# Patient Record
Sex: FEMALE | Race: WHITE
[De-identification: names, ages, dates, MRNs, and addresses within clinical notes are randomized per-mention and may not be internally consistent; named-entity substitution may affect disease eponyms.]

---

## 2022-09-02 ENCOUNTER — HOSPITAL ENCOUNTER (OUTPATIENT)
Dept: HOSPITAL 47 - RADXRMAIN | Age: 77
Discharge: HOME | End: 2022-09-02
Attending: INTERNAL MEDICINE
Payer: MEDICARE

## 2022-09-02 DIAGNOSIS — C25.0: Primary | ICD-10-CM

## 2022-09-02 PROCEDURE — 71046 X-RAY EXAM CHEST 2 VIEWS: CPT

## 2022-09-02 NOTE — XR
EXAMINATION TYPE: XR chest 2V

 

DATE OF EXAM: 9/2/2022

 

COMPARISON: NONE

 

HISTORY: Malignant neoplasm head of pancreas. Weakness and shortness of breath.

 

TECHNIQUE:  Frontal and lateral views of the chest are obtained.

 

FINDINGS: Suspect mild underlying emphysematous change. Left internal jugular Mediport catheter termi
nates in SVC. There is no focal air space opacity, pleural effusion, or pneumothorax seen.  The cardi
ac silhouette size is within normal limits with atherosclerotic change aortic knob.   The osseous str
uctures are demineralized.

 

IMPRESSION:  Mild chronic emphysematous change without acute pulmonary process.

## 2022-09-09 ENCOUNTER — HOSPITAL ENCOUNTER (OUTPATIENT)
Dept: HOSPITAL 47 - RADCTMAIN | Age: 77
Discharge: HOME | End: 2022-09-09
Attending: INTERNAL MEDICINE
Payer: MEDICARE

## 2022-09-09 DIAGNOSIS — C25.0: ICD-10-CM

## 2022-09-09 DIAGNOSIS — Z03.89: Primary | ICD-10-CM

## 2022-09-09 LAB — BUN SERPL-SCNC: 16 MG/DL (ref 7–17)

## 2022-09-09 PROCEDURE — 36415 COLL VENOUS BLD VENIPUNCTURE: CPT

## 2022-09-09 PROCEDURE — 82565 ASSAY OF CREATININE: CPT

## 2022-09-09 PROCEDURE — 71260 CT THORAX DX C+: CPT

## 2022-09-09 PROCEDURE — 84520 ASSAY OF UREA NITROGEN: CPT

## 2022-09-09 NOTE — CT
EXAMINATION TYPE: CT chest w con

 

DATE OF EXAM: 9/9/2022

 

COMPARISON: Radiograph 9/2/2022

 

HISTORY: 77-year-old female productive cough, hx of pancreatic ca

 

TECHNIQUE: Contiguous axial scanning of the chest after the administration of 70cc mL of Isovue 300. 
 Coronal/sagittal reconstructions performed.

 

CT DLP: 452mGycm. Automatic exposure control utilized for a dose reduction.

 

 

FINDINGS:

Oil cyst measuring 1.2 cm lateral right breast.

 

Heart normal size without pericardial effusion. Left anterior chest wall injection port with catheter
 tip at the lower SVC.

 

Aorta normal caliber with scattered mild to moderate atherosclerotic arch and descending thoracic aor
tic calcifications. Moderate focal atherosclerotic narrowing at the proximal right subclavian artery.


 

No thoracic lymphadenopathy by CT size criteria.

 

Mild diffuse bronchial wall thickening. There are a few scattered focal peribronchovascular and perip
heral opacities. Some tree-in-bud opacity posterior left upper lobe. Some strandy atelectasis or scar
ring in the lower lungs. No large focal area of consolidation or pleural effusion.

 

Small to moderate-sized hiatal hernia. Visualized upper abdomen shows pneumobilia. A metallic stent i
s present in the bile duct and there is diffuse dilatation of the main pancreatic duct noted likely s
econdary to the patient's reported pancreatic cancer.

 

3 mm nonobstructive right renal calculus.

 

Moderate atherosclerotic calcifications visualized upper abdominal aorta.

 

Bones: Arthropathy in the lumbar spine. Moderate degenerative disc disease L2-L3.

 

 

IMPRESSION:  

 

1. A few scattered foci of peribronchovascular opacity especially in the upper and mid lungs. Finding
s may be seen with developing COVID pneumonia or interstitial pneumonitis such as . Clinically cor
relate.

2. Small-to-moderate size hiatal hernia. Pneumobilia and metallic biliary stent. Main pancreatic duct
al dilatation. Findings likely on the basis of patient's known pancreatic cancer. Nonobstructive 3 mm
 right renal stone.

## 2022-09-22 ENCOUNTER — HOSPITAL ENCOUNTER (OUTPATIENT)
Dept: HOSPITAL 47 - LABWHC1 | Age: 77
Discharge: HOME | End: 2022-09-22
Attending: NURSE PRACTITIONER
Payer: MEDICARE

## 2022-09-22 ENCOUNTER — HOSPITAL ENCOUNTER (EMERGENCY)
Dept: HOSPITAL 47 - EC | Age: 77
Discharge: HOME | End: 2022-09-22
Payer: MEDICARE

## 2022-09-22 VITALS
DIASTOLIC BLOOD PRESSURE: 70 MMHG | TEMPERATURE: 96.8 F | SYSTOLIC BLOOD PRESSURE: 147 MMHG | HEART RATE: 105 BPM | RESPIRATION RATE: 20 BRPM

## 2022-09-22 DIAGNOSIS — I10: ICD-10-CM

## 2022-09-22 DIAGNOSIS — R00.2: Primary | ICD-10-CM

## 2022-09-22 DIAGNOSIS — J45.909: ICD-10-CM

## 2022-09-22 DIAGNOSIS — C25.0: Primary | ICD-10-CM

## 2022-09-22 LAB
ALBUMIN SERPL-MCNC: 4.1 G/DL (ref 3.5–5)
ALP SERPL-CCNC: 101 U/L (ref 38–126)
ALT SERPL-CCNC: 75 U/L (ref 4–34)
ANION GAP SERPL CALC-SCNC: 10 MMOL/L
APTT BLD: 21.5 SEC (ref 22–30)
AST SERPL-CCNC: 63 U/L (ref 14–36)
BASOPHILS # BLD AUTO: 0.1 K/UL (ref 0–0.2)
BASOPHILS NFR BLD AUTO: 1 %
BUN SERPL-SCNC: 18 MG/DL (ref 7–17)
CALCIUM SPEC-MCNC: 9.4 MG/DL (ref 8.4–10.2)
CHLORIDE SERPL-SCNC: 103 MMOL/L (ref 98–107)
CO2 SERPL-SCNC: 23 MMOL/L (ref 22–30)
EOSINOPHIL # BLD AUTO: 0 K/UL (ref 0–0.7)
EOSINOPHIL NFR BLD AUTO: 0 %
ERYTHROCYTE [DISTWIDTH] IN BLOOD BY AUTOMATED COUNT: 3.68 M/UL (ref 3.8–5.4)
ERYTHROCYTE [DISTWIDTH] IN BLOOD: 13.3 % (ref 11.5–15.5)
GLUCOSE SERPL-MCNC: 98 MG/DL (ref 74–99)
HCT VFR BLD AUTO: 33.6 % (ref 34–46)
HGB BLD-MCNC: 11 GM/DL (ref 11.4–16)
INR PPP: 1 (ref ?–1.2)
LYMPHOCYTES # SPEC AUTO: 2.6 K/UL (ref 1–4.8)
LYMPHOCYTES NFR SPEC AUTO: 43 %
MAGNESIUM SPEC-SCNC: 1.8 MG/DL (ref 1.6–2.3)
MCH RBC QN AUTO: 29.9 PG (ref 25–35)
MCHC RBC AUTO-ENTMCNC: 32.8 G/DL (ref 31–37)
MCV RBC AUTO: 91.3 FL (ref 80–100)
MONOCYTES # BLD AUTO: 0.1 K/UL (ref 0–1)
MONOCYTES NFR BLD AUTO: 1 %
NEUTROPHILS # BLD AUTO: 3.3 K/UL (ref 1.3–7.7)
NEUTROPHILS NFR BLD AUTO: 53 %
PLATELET # BLD AUTO: 287 K/UL (ref 150–450)
POTASSIUM SERPL-SCNC: 4.4 MMOL/L (ref 3.5–5.1)
PROT SERPL-MCNC: 6.8 G/DL (ref 6.3–8.2)
PT BLD: 10.6 SEC (ref 9–12)
SODIUM SERPL-SCNC: 136 MMOL/L (ref 137–145)
WBC # BLD AUTO: 6.1 K/UL (ref 3.8–10.6)

## 2022-09-22 PROCEDURE — 85730 THROMBOPLASTIN TIME PARTIAL: CPT

## 2022-09-22 PROCEDURE — 99285 EMERGENCY DEPT VISIT HI MDM: CPT

## 2022-09-22 PROCEDURE — 85025 COMPLETE CBC W/AUTO DIFF WBC: CPT

## 2022-09-22 PROCEDURE — 71046 X-RAY EXAM CHEST 2 VIEWS: CPT

## 2022-09-22 PROCEDURE — 84484 ASSAY OF TROPONIN QUANT: CPT

## 2022-09-22 PROCEDURE — 80053 COMPREHEN METABOLIC PANEL: CPT

## 2022-09-22 PROCEDURE — 36415 COLL VENOUS BLD VENIPUNCTURE: CPT

## 2022-09-22 PROCEDURE — 83735 ASSAY OF MAGNESIUM: CPT

## 2022-09-22 PROCEDURE — 93005 ELECTROCARDIOGRAM TRACING: CPT

## 2022-09-22 PROCEDURE — 85610 PROTHROMBIN TIME: CPT

## 2022-09-22 NOTE — XR
EXAMINATION TYPE: XR chest 2V

 

DATE OF EXAM: 9/22/2022

 

COMPARISON: Chest x-ray 9/2/2022

 

HISTORY: Dysrhythmia, tachycardia

 

TECHNIQUE:  Frontal and lateral views of the chest are obtained.

 

FINDINGS:  There is a Port-A-Cath in left pectoral region coursing via jugular approach, distal tip o
f the catheter is at the cavoatrial junction. No evident pneumothorax or pleural effusion. Bilateral 
pulmonary nodules are again seen. Cardiac mediastinal silhouette is stable. Aorta is dense. Bones are
 unchanged. Prominent lung volumes are consistent with underlying COPD.

 

IMPRESSION:  Metastatic disease.

## 2022-09-22 NOTE — ED
General Adult HPI





- General


Chief complaint: Arrhythmia/Palpitations


Stated complaint: Heart Racing


Time Seen by Provider: 09/22/22 19:10


Source: patient


Mode of arrival: ambulatory


Limitations: no limitations





- History of Present Illness


Initial comments: 


Patient is a 77-year-old female with history of pancreatic cancer currently 

receiving chemo presenting with chief complaint of elevated heart rate.  Patient

was seen on her oncologist office today.  Patient's fitbit was indicating an 

elevated heart rate throughout the afternoon.  The practitioner at the office 

recommended she reports the ER for further evaluation, and was concerned for 

dehydration.  Patient denies any chest pain, shortness of breath, fever, chills,

nausea, vomiting, palpitations, weakness, numbness, tingling, abdominal pain, 

hematochezia, melena, dysuria, hematuria, lower extremity swelling, recent 

surgery or travel.








- Related Data


                                    Allergies











Allergy/AdvReac Type Severity Reaction Status Date / Time


 


No Known Allergies Allergy   Verified 09/22/22 15:02














Review of Systems


ROS Statement: 


Those systems with pertinent positive or pertinent negative responses have been 

documented in the HPI.





ROS Other: All systems not noted in ROS Statement are negative.





Past Medical History


Past Medical History: Asthma, Hypertension


Additional Past Medical History / Comment(s): cx-pancreatic.  overactive bladder


History of Any Multi-Drug Resistant Organisms: None Reported


Past Surgical History: No Surgical Hx Reported, Appendectomy, Cholecystectomy, 

Hysterectomy, Tonsillectomy


Additional Past Surgical History / Comment(s): stent in liver


Past Psychological History: No Psychological Hx Reported


Smoking Status: Never smoker


Past Alcohol Use History: None Reported


Past Drug Use History: None Reported





General Exam


Limitations: no limitations


General appearance: alert, in no apparent distress


Head exam: Present: atraumatic, normocephalic, normal inspection


Eye exam: Present: normal appearance, EOMI.  Absent: scleral icterus, 

periorbital swelling


Neck exam: Present: normal inspection


Respiratory exam: Present: normal lung sounds bilaterally.  Absent: respiratory 

distress, wheezes, rales, rhonchi, stridor


Cardiovascular Exam: Present: regular rate, normal rhythm, normal heart sounds. 

Absent: systolic murmur, diastolic murmur, rubs, gallop, clicks


Neurological exam: Present: alert, oriented X3, CN II-XII intact


Psychiatric exam: Present: normal affect, normal mood


Skin exam: Present: warm, dry, intact, normal color.  Absent: rash





Course


                                   Vital Signs











  09/22/22





  14:58


 


Temperature 96.8 F L


 


Pulse Rate 105 H


 


Respiratory 20





Rate 


 


Blood Pressure 147/70


 


O2 Sat by Pulse 99





Oximetry 














EKG Findings





- EKG Comments:


EKG Findings:: Sinus rhythm ventricular rate 96.  NV interval 176.  QRS duration

82.  QT//378.  No ischemic changes.  This EKG was also shown to and 

interpreted by my attending Dr. Davidson





Medical Decision Making





- Medical Decision Making


Patient is a 77-year-old female presenting with chief complaint of elevated 

heart rate.  Patient's fitness watch was detecting elevated heart rate 

throughout this afternoon, she was being seen at her oncologist office today, 

who advised her to report to the ER with concerns of dehydration.  On 

examination heart and lungs are clear to auscultation, patient's heart rate is 

sustained in the 90s when I'm speaking with her.  EKG shows no ischemic changes.

 Lab work shows no leukocytosis, hemoglobin 11.  Sodium 136.  Mild tr

ansaminitis.  Troponin is less than 0.012.  Magnesium 1.8.  Chest x-ray shows no

new changes, there are some bilateral pulmonary nodules that are seen again.  On

reassessment patient reports that she wishes to go home at this time, stating 

that she feels well.  Educated patient on potential need for Holter monitor. 

Follow-up with PCP.  Report back to ER with any new or worsening symptoms.  

Discussed return parameters and answered all questions.  Patient conveyed verbal

understanding and agreed to the plan.  I discussed this case in detail with my 

attending Dr. Davidson.








- Lab Data


Result diagrams: 


                                 09/22/22 17:43





                                 09/22/22 17:43


                                   Lab Results











  09/22/22 09/22/22 09/22/22 Range/Units





  17:43 17:43 17:43 


 


WBC  6.1    (3.8-10.6)  k/uL


 


RBC  3.68 L    (3.80-5.40)  m/uL


 


Hgb  11.0 L    (11.4-16.0)  gm/dL


 


Hct  33.6 L    (34.0-46.0)  %


 


MCV  91.3    (80.0-100.0)  fL


 


MCH  29.9    (25.0-35.0)  pg


 


MCHC  32.8    (31.0-37.0)  g/dL


 


RDW  13.3    (11.5-15.5)  %


 


Plt Count  287    (150-450)  k/uL


 


MPV  8.3    


 


Neutrophils %  53    %


 


Lymphocytes %  43    %


 


Monocytes %  1    %


 


Eosinophils %  0    %


 


Basophils %  1    %


 


Neutrophils #  3.3    (1.3-7.7)  k/uL


 


Lymphocytes #  2.6    (1.0-4.8)  k/uL


 


Monocytes #  0.1    (0-1.0)  k/uL


 


Eosinophils #  0.0    (0-0.7)  k/uL


 


Basophils #  0.1    (0-0.2)  k/uL


 


PT   10.6   (9.0-12.0)  sec


 


INR   1.0   (<1.2)  


 


APTT   21.5 L   (22.0-30.0)  sec


 


Sodium    136 L  (137-145)  mmol/L


 


Potassium    4.4  (3.5-5.1)  mmol/L


 


Chloride    103  ()  mmol/L


 


Carbon Dioxide    23  (22-30)  mmol/L


 


Anion Gap    10  mmol/L


 


BUN    18 H  (7-17)  mg/dL


 


Creatinine    0.51 L  (0.52-1.04)  mg/dL


 


Est GFR (CKD-EPI)AfAm    >90  (>60 ml/min/1.73 sqM)  


 


Est GFR (CKD-EPI)NonAf    >90  (>60 ml/min/1.73 sqM)  


 


Glucose    98  (74-99)  mg/dL


 


Calcium    9.4  (8.4-10.2)  mg/dL


 


Magnesium    1.8  (1.6-2.3)  mg/dL


 


Total Bilirubin    0.6  (0.2-1.3)  mg/dL


 


AST    63 H  (14-36)  U/L


 


ALT    75 H  (4-34)  U/L


 


Alkaline Phosphatase    101  ()  U/L


 


Troponin I     (0.000-0.034)  ng/mL


 


Total Protein    6.8  (6.3-8.2)  g/dL


 


Albumin    4.1  (3.5-5.0)  g/dL














  09/22/22 Range/Units





  17:43 


 


WBC   (3.8-10.6)  k/uL


 


RBC   (3.80-5.40)  m/uL


 


Hgb   (11.4-16.0)  gm/dL


 


Hct   (34.0-46.0)  %


 


MCV   (80.0-100.0)  fL


 


MCH   (25.0-35.0)  pg


 


MCHC   (31.0-37.0)  g/dL


 


RDW   (11.5-15.5)  %


 


Plt Count   (150-450)  k/uL


 


MPV   


 


Neutrophils %   %


 


Lymphocytes %   %


 


Monocytes %   %


 


Eosinophils %   %


 


Basophils %   %


 


Neutrophils #   (1.3-7.7)  k/uL


 


Lymphocytes #   (1.0-4.8)  k/uL


 


Monocytes #   (0-1.0)  k/uL


 


Eosinophils #   (0-0.7)  k/uL


 


Basophils #   (0-0.2)  k/uL


 


PT   (9.0-12.0)  sec


 


INR   (<1.2)  


 


APTT   (22.0-30.0)  sec


 


Sodium   (137-145)  mmol/L


 


Potassium   (3.5-5.1)  mmol/L


 


Chloride   ()  mmol/L


 


Carbon Dioxide   (22-30)  mmol/L


 


Anion Gap   mmol/L


 


BUN   (7-17)  mg/dL


 


Creatinine   (0.52-1.04)  mg/dL


 


Est GFR (CKD-EPI)AfAm   (>60 ml/min/1.73 sqM)  


 


Est GFR (CKD-EPI)NonAf   (>60 ml/min/1.73 sqM)  


 


Glucose   (74-99)  mg/dL


 


Calcium   (8.4-10.2)  mg/dL


 


Magnesium   (1.6-2.3)  mg/dL


 


Total Bilirubin   (0.2-1.3)  mg/dL


 


AST   (14-36)  U/L


 


ALT   (4-34)  U/L


 


Alkaline Phosphatase   ()  U/L


 


Troponin I  <0.012  (0.000-0.034)  ng/mL


 


Total Protein   (6.3-8.2)  g/dL


 


Albumin   (3.5-5.0)  g/dL














Disposition


Clinical Impression: 


 Palpitations





Disposition: HOME SELF-CARE


Condition: Good


Instructions (If sedation given, give patient instructions):  Heart Palpitations

(ED), Tachycardia (ED)


Additional Instructions: 


Follow up with PCP and cardiologist. May need Holter monitor study. Report back 

to ER with any new or worsening symptoms. 


Is patient prescribed a controlled substance at d/c from ED?: No


Referrals: 


Nabil Koch MD [Primary Care Provider] - 1-2 days


Time of Disposition: 19:44